# Patient Record
Sex: FEMALE | Race: WHITE | HISPANIC OR LATINO | Employment: FULL TIME | URBAN - METROPOLITAN AREA
[De-identification: names, ages, dates, MRNs, and addresses within clinical notes are randomized per-mention and may not be internally consistent; named-entity substitution may affect disease eponyms.]

---

## 2022-11-17 DIAGNOSIS — Z12.31 OTHER SCREENING MAMMOGRAM: Primary | ICD-10-CM

## 2022-12-05 ENCOUNTER — HOSPITAL ENCOUNTER (OUTPATIENT)
Dept: RADIOLOGY | Facility: HOSPITAL | Age: 42
Discharge: HOME | End: 2022-12-05
Attending: CLINIC/CENTER
Payer: COMMERCIAL

## 2022-12-05 DIAGNOSIS — Z12.31 OTHER SCREENING MAMMOGRAM: ICD-10-CM

## 2022-12-05 PROCEDURE — 77067 MAMMO DIGITAL SCREENING BILAT WITH TOMO: ICD-10-PCS | Mod: 26,,, | Performed by: RADIOLOGY

## 2022-12-05 PROCEDURE — 77063 MAMMO DIGITAL SCREENING BILAT WITH TOMO: ICD-10-PCS | Mod: 26,,, | Performed by: RADIOLOGY

## 2022-12-05 PROCEDURE — 77067 SCR MAMMO BI INCL CAD: CPT | Mod: 26,,, | Performed by: RADIOLOGY

## 2022-12-05 PROCEDURE — 77063 BREAST TOMOSYNTHESIS BI: CPT | Mod: 26,,, | Performed by: RADIOLOGY

## 2022-12-05 PROCEDURE — 77067 SCR MAMMO BI INCL CAD: CPT | Mod: TC

## 2022-12-05 PROCEDURE — 77063 BREAST TOMOSYNTHESIS BI: CPT | Mod: TC

## 2023-09-18 ENCOUNTER — CLINICAL SUPPORT (OUTPATIENT)
Dept: REHABILITATION | Facility: HOSPITAL | Age: 43
End: 2023-09-18
Payer: COMMERCIAL

## 2023-09-18 DIAGNOSIS — R29.898 WEAKNESS OF RIGHT LOWER EXTREMITY: ICD-10-CM

## 2023-09-18 DIAGNOSIS — R26.89 IMPAIRED GAIT AND MOBILITY: ICD-10-CM

## 2023-09-18 DIAGNOSIS — M25.661 DECREASED RANGE OF MOTION (ROM) OF RIGHT KNEE: ICD-10-CM

## 2023-09-18 PROCEDURE — 97161 PT EVAL LOW COMPLEX 20 MIN: CPT

## 2023-09-20 PROBLEM — R29.898 WEAKNESS OF RIGHT LOWER EXTREMITY: Status: ACTIVE | Noted: 2023-09-20

## 2023-09-20 PROBLEM — R26.89 IMPAIRED GAIT AND MOBILITY: Status: ACTIVE | Noted: 2023-09-20

## 2023-09-20 PROBLEM — M25.661 DECREASED RANGE OF MOTION (ROM) OF RIGHT KNEE: Status: ACTIVE | Noted: 2023-09-20

## 2023-09-20 NOTE — PLAN OF CARE
[facility]   Physical Therapy Initial Evaluation      Name: Jennifer Christian  Clinic Number: [id number]    Therapy Diagnosis:   Encounter Diagnoses   Name Primary?    Decreased range of motion (ROM) of right knee     Weakness of right lower extremity     Impaired gait and mobility         Physician: Matthias Roper MD    Physician Orders: PT Eval and Treat   Medical Diagnosis from Referral: M70.50 (ICD-10-CM) - Pes anserinus bursitis  Evaluation Date: 9/18/2023  Authorization Period Expiration: 9/12/2024  Plan of Care Expiration: 12/18/2023  Progress Note Due: 10/18/2023  Visit # / Visits authorized: 1/1 (pending)   FOTO: 1/ 3    Precautions: Standard     Time In: 8:55 AM  Time Out: 9:40 AM  Total Billable Time: 45 minutes    Subjective   Date of onset: 5 weeks ago     History of current condition - 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 She states she was lifting a case of water from the shelf to put into a grocrery basket, turned and felt increased pain in her Right knee. She reports later that day the pain became severe and her R knee swelled. She reports difficulty walking because of the pain. She states she took off work the past 2 days and the pain is a little better, but when she works the pain is worse at the end of the work day. She describes Right knee pain as achy and sharp. Aggravating factors include bending R knee, prolonged standing, walking, and climbing stairs.       Falls: no     Imaging: see imaging section    Prior Therapy: no   Social History: 2 story home   Occupation: nanda at [employer]   Prior Level of Function: no limitations  Current Level of Function: difficulty with prolonged standing, walking, climbing stairs, limited with job duties    Pain:  Current 4/10, worst 7/10, best 3/10   Location: Right knee   Description: Aching and Sharp  Aggravating Factors: bending knee,  standing, walking, climbing stairs   Easing Factors: pain medication, rest     Patients goals: walk normally, work without pain/limitations      Medical History:   No past medical history on file.    Surgical History:   Jennifer Christian  has no past surgical history on file.    Medications:   Jennifer currently has no medications in their medication list.    Allergies:   Review of patient's allergies indicates:  Not on File     Objective      Gait: antalgic, Right trendelenburg, decreased stance time and heel strike R LE    Range of Motion: (Measured in degrees)   Knee Right active Right Passive Left Active Left passive Goal   Flexion 110, pain 115, pain 127  125 deg.   Extension 0  0  0 deg.     Lower Extremity Strength:  Right LE  Left LE  Goal   Knee extension: 4/5, pain medial knee Knee extension: 5/5 5/5   Knee flexion: 4+/5, pain medial knee  Knee flexion: 5/5 5/5   Hip flexion: 5/5 Hip flexion: 5/5 5/5   Hip extension:  4+/5 Hip extension: 5/5 5/5   Hip abduction: Unable to test due to pain  Hip abduction: NT 5/5     Ligamentous Pathology   Test Right   Anterior Drawer Negative   Lachman's Negative   Lateral Collateral Stress Negative   Medial Collateral Stress Negative   Posterior Drawer Negative   Sag Sign Negative   Apley's Distraction Negative     Meniscal Pathology   Test Right   Hemanth's Positive   Thessaly Test @ 5 deg. NT due to pain   Thessaly Test @ 20 deg. NT due to pain    Apley's Compression Positive       Palpation: TTP Right tibiofemoral medial joint line, patella tendon, distal medial and lateral hamstring tendons, pes ascerine insertion     Edema: none noted       Intake Outcome Measure for FOTO Knee Survey    Therapist reviewed FOTO scores for Jennifer Christian on 9/18/2023.   FOTO report - see Media section or FOTO account episode details.    Intake Score: 28% functional ability          Treatment     Total Treatment time (time-based codes) separate from Evaluation: 5 minutes     JENNIFER  received the treatments listed below:      therapeutic exercises to develop strength and ROM for 5 minutes including:    Education HEP  Quad sets  Heel slides  LAQ      Patient Education and Home Exercises     Education provided:   - Anatomy and objective findings  - Physical Therapy Plan of Care  - HEP     Written Home Exercises Provided: yes. Exercises were reviewed and JENNIFER was able to demonstrate them prior to the end of the session.  JENNIFER demonstrated good  understanding of the education provided. See EMR under Patient Instructions for exercises provided during therapy sessions.    Assessment   Jennifer is a 43 y.o. female referred to outpatient Physical Therapy with a medical diagnosis of M70.50 (ICD-10-CM) - Pes anserinus bursitis. Upon physical assessment, patient demonstrates decreased Right knee range of motion, Right LE weakness, impaired gait, tenderness to palpation to Right medial tibiofemoral joint line and patella tendon, and positive meniscus pathology tests. Patient's impairments limit her ability to stand for prolonged periods, walk, squat, climb stairs, and perform job duties as nanda at [employer]. Patient prognosis is Good. Patient will benefit from skilled outpatient Physical Therapy to address the deficits stated above and in the chart below, provide patient education, and to maximize patient's level of independence and quality of life.     Plan of care discussed with patient: Yes  Patient's spiritual, cultural and educational needs considered and patient is agreeable to the plan of care and goals as stated below:     Anticipated Barriers for therapy: none    Medical Necessity is demonstrated by the following  History  Co-morbidities and personal factors that may impact the plan of care [x] LOW: no personal factors / co-morbidities  [] MODERATE: 1-2 personal factors / co-morbidities  [] HIGH: 3+ personal factors / co-morbidities    Moderate / High Support Documentation:   Co-morbidities affecting  plan of care:     Personal Factors:   no deficits     Examination  Body Structures and Functions, activity limitations and participation restrictions that may impact the plan of care [] LOW: addressing 1-2 elements  [x] MODERATE: 3+ elements  [] HIGH: 4+ elements (please support below)    Moderate / High Support Documentation: knee ROM, LE strength, gait     Clinical Presentation [] LOW: stable  [x] MODERATE: Evolving  [] HIGH: Unstable     Decision Making/ Complexity Score: low       Goals:    SHORT TERM GOALS:  4 weeks 9/18/23   Patient will be independent with HEP in order to assist therapy in return to maximal function.    Pain rating at Worst: 4/10 or less for improved tolerance to prolonged walking.     Patient will demonstrate Right knee AROM flexion at least 120 degrees.       LONG TERM GOALS: 8 weeks 9/18/23   Patient will improve AROM to stated goals in order to return to maximal functional potential.     Patient will demonstrate gross Right LE strength 5/5 to climb stairs with minimal to no difficulty.     Pain Rating at Worst: 2/10 or less to improve overall Quality of Life.     Patient will meet predicted functional outcome (FOTO) score: 60% functional ability or greater to increase self-perceived functional ability.    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       Plan   Plan of care Certification: 9/18/2023 to 10/18/2023.    Outpatient Physical Therapy 1-2 times weekly for 6-8 weeks to include the following interventions: Gait Training, Manual Therapy, Moist Heat/ Ice, Neuromuscular Re-ed, Patient Education, Therapeutic Activities, and Therapeutic Exercise.     Milagro Cleary PT      Physician's Signature: _________________________________________ Date: ________________

## 2023-09-21 ENCOUNTER — OFFICE VISIT (OUTPATIENT)
Dept: ORTHOPEDICS | Facility: CLINIC | Age: 43
End: 2023-09-21
Payer: COMMERCIAL

## 2023-09-21 DIAGNOSIS — M25.561 ACUTE PAIN OF RIGHT KNEE: Primary | ICD-10-CM

## 2023-09-21 DIAGNOSIS — M25.569 KNEE PAIN, UNSPECIFIED CHRONICITY, UNSPECIFIED LATERALITY: Primary | ICD-10-CM

## 2023-09-21 PROCEDURE — 1160F PR REVIEW ALL MEDS BY PRESCRIBER/CLIN PHARMACIST DOCUMENTED: ICD-10-PCS | Mod: CPTII,S$GLB,, | Performed by: ORTHOPAEDIC SURGERY

## 2023-09-21 PROCEDURE — 99204 OFFICE O/P NEW MOD 45 MIN: CPT | Mod: S$GLB,,, | Performed by: ORTHOPAEDIC SURGERY

## 2023-09-21 PROCEDURE — 99204 PR OFFICE/OUTPT VISIT, NEW, LEVL IV, 45-59 MIN: ICD-10-PCS | Mod: S$GLB,,, | Performed by: ORTHOPAEDIC SURGERY

## 2023-09-21 PROCEDURE — 1159F PR MEDICATION LIST DOCUMENTED IN MEDICAL RECORD: ICD-10-PCS | Mod: CPTII,S$GLB,, | Performed by: ORTHOPAEDIC SURGERY

## 2023-09-21 PROCEDURE — 99999 PR PBB SHADOW E&M-NEW PATIENT-LVL III: ICD-10-PCS | Mod: PBBFAC,,, | Performed by: ORTHOPAEDIC SURGERY

## 2023-09-21 PROCEDURE — 1159F MED LIST DOCD IN RCRD: CPT | Mod: CPTII,S$GLB,, | Performed by: ORTHOPAEDIC SURGERY

## 2023-09-21 PROCEDURE — 99999 PR PBB SHADOW E&M-NEW PATIENT-LVL III: CPT | Mod: PBBFAC,,, | Performed by: ORTHOPAEDIC SURGERY

## 2023-09-21 PROCEDURE — 1160F RVW MEDS BY RX/DR IN RCRD: CPT | Mod: CPTII,S$GLB,, | Performed by: ORTHOPAEDIC SURGERY

## 2023-09-21 RX ORDER — DICLOFENAC SODIUM 75 MG/1
75 TABLET, DELAYED RELEASE ORAL 2 TIMES DAILY
Qty: 60 TABLET | Refills: 0 | Status: SHIPPED | OUTPATIENT
Start: 2023-09-21

## 2023-09-21 NOTE — PROGRESS NOTES
Assessment: 43 y.o. female with Right knee osteoarthritis     We discussed the findings on xray and clinical exam as well as the natural history of arthritis.     Plan:   - MRI R knee   - Continue PT  - heat   - Increased diclofenac dose to 75, stop taking 50 mg   - Return to clinic once MRI complete     All questions were answered in detail. The patient is in full agreement with the treatment plan and will proceed accordingly.    A note notifying Dr. Matthias Roper of my findings was sent via the electronic medical record     Chief Complaint   Patient presents with    Right Knee - Pain       This patient was seen in consultation at the request of Dr. Matthias Roper     HPI (9/21/23): Jennifer Foss is a 43 y.o. female who presents today complaining of right knee pain  Duration of symptoms:  about 6 weeks  Trauma or new activity: yes - pivoted while carrying something - felt pain in her R knee and back  Felt a little pain the next day in the knee. No pain in back the next day   Has had pain since then  No improvement with diclofenac pills and cream x 2 weeks prescribed by PCP   Did PT this week - concern for meniscus tear   Has had some swelling   + popping   Has not been able to go to work because of her pain   Has MRI ordered     Has another chart: [id number]  PT visits are under this MRN     This is the extent of the patient's complaints at this time.     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    History reviewed. No pertinent past medical history.    There is no problem list on file for this patient.      History reviewed. No pertinent surgical history.      No family history on file.    Physical Exam:   Vitals:    09/21/23 1249   PainSc:   8   PainLoc: Knee       General: Patient is alert, awake and oriented to time, place and person. Mood and affect are appropriate.  Patient does not  appear to be in any distress, denies any constitutional symptoms and appears stated age.   HEENT: Pupils are equal and round, sclera are not injected. External examination of ears and nose reveals no abnormalities. Cranial nerves II-X are grossly intact  Skin: no rashes, abrasions or open wounds on the affected extremity   Resp: No respiratory distress or audible wheezing   CV: 2+  pulses, all extremities warm and well perfused   Right knee(s)  Bilateral KNEE EXAMINATION        OBSERVATION / INSPECTION   Gait:                            antaglic  Alignment:                  neutral  Scars:                         none  Muscle atrophy:          none  Effusion:                     none  Warmth:                      none   Discoloration:              none      TENDERNESS                                                                                                                               Patella                         -                       Lateral joint line                       -   Peripatellar medial      -                       Medial joint line                       ++     Peripatellar lateral       -                       Medial plica                             -             Patellar tendon            -                       Popliteal fossa                        -             Quad tendon               -                       Gastrocnemius                        -  Prepatellar Bursa        -                       Quadricep                               -  Tibial tubercle              -                      Thigh/hamstring                      -  Pes anserine/HS         -                       Fibula                                      -  ITB                               -                      Tibia                                        -  Tib/fib joint                   -                      LCL                                          -                         MFC                            -                        MCL:    Proximal                      -                         LFC                             -                                   Distal                           -                                                                ROM:                          PASSIVE                                                 ACTIVE   Left :                            0 / 150                                  0 / 150      Right :                          10 / 90                              10 / 90      PATELLOFEMORAL EXAMINATION:  See above noted areas of tenderness.   Patella position    Subluxation / dislocation:         Normal   Sup / Inf;                                  Normal   Crepitus (PF):                                      Absent   Patellar Mobility:                                               Medial-lateral:                         Normal  Superior-inferior:                     Normal   Inferior tilt                                Normal  Patellar tendon:                       Normal  Lateral tilt:                               Normal  J-sign:                                    Absent  Patellofemoral grind:               non painful         MENISCAL SIGNS:                              Pain on terminal extension:                yes  Pain on terminal flexion:                     yes  Hemanths maneuver:                       + medial   Squat                                                  not tested      LIGAMENT EXAMINATION:  ACL / Lachman:         normal (0 - 2 mm)   PCL-Post.  drawer:   normal (0 - 2 mm)  MCL- Valgus:             negative  LCL- Varus:  negative       STRENGTH:   (* = with pain)           PAINFUL SIDE  Quadricep                               5/5  Hamstrin/5     EXTREMITY NEURO-VASCULAR EXAMINATION:     Sensation:  Grossly intact to light touch all dermatomal regions.   Motor Function:  Fully intact motor function at hip, knee, foot and ankle     DTRs;  quadriceps and  achilles 2+.    Vascular status:  DP and PT pulses 2+, brisk capillary refill, symmetric.       Imaging:  3 views right knee: mild medial compartment OA with joint space narrowing    I personally reviewed and interpreted the patient's imaging obtained today in clinic     This note was created by combination of typed  and M-Modal dictation. Transcription and phonetic errors may be present.  If there are any questions, please contact me.    No current outpatient medications on file.

## 2023-09-28 ENCOUNTER — HOSPITAL ENCOUNTER (OUTPATIENT)
Dept: RADIOLOGY | Facility: HOSPITAL | Age: 43
Discharge: HOME | End: 2023-09-28
Attending: ORTHOPAEDIC SURGERY
Payer: COMMERCIAL

## 2023-09-28 ENCOUNTER — CLINICAL SUPPORT (OUTPATIENT)
Dept: REHABILITATION | Facility: HOSPITAL | Age: 43
End: 2023-09-28
Payer: COMMERCIAL

## 2023-09-28 DIAGNOSIS — R29.898 WEAKNESS OF RIGHT LOWER EXTREMITY: ICD-10-CM

## 2023-09-28 DIAGNOSIS — R26.89 IMPAIRED GAIT AND MOBILITY: ICD-10-CM

## 2023-09-28 DIAGNOSIS — M25.561 ACUTE PAIN OF RIGHT KNEE: ICD-10-CM

## 2023-09-28 DIAGNOSIS — M25.661 DECREASED RANGE OF MOTION (ROM) OF RIGHT KNEE: Primary | ICD-10-CM

## 2023-09-28 PROCEDURE — 97110 THERAPEUTIC EXERCISES: CPT

## 2023-09-28 PROCEDURE — 73721 MRI JNT OF LWR EXTRE W/O DYE: CPT | Mod: 26,RT,, | Performed by: RADIOLOGY

## 2023-09-28 PROCEDURE — 73721 MRI KNEE WITHOUT CONTRAST RIGHT: ICD-10-PCS | Mod: 26,RT,, | Performed by: RADIOLOGY

## 2023-09-28 PROCEDURE — 73721 MRI JNT OF LWR EXTRE W/O DYE: CPT | Mod: TC,RT

## 2023-09-28 NOTE — PROGRESS NOTES
[facility]   Physical Therapy Treatment Note      Name: Jennifer davalos Flores  Clinic Number: [id number]    Therapy Diagnosis:   Encounter Diagnoses   Name Primary?    Decreased range of motion (ROM) of right knee Yes    Weakness of right lower extremity     Impaired gait and mobility      Physician: Matthias Roper MD    Visit Date: 9/28/2023    Physician Orders: PT Eval and Treat   Medical Diagnosis from Referral: M70.50 (ICD-10-CM) - Pes anserinus bursitis  Evaluation Date: 9/18/2023  Authorization Period Expiration: 9/12/2024  Plan of Care Expiration: 12/18/2023  Progress Note Due: 10/18/2023  Visit # / Visits authorized: 1/1, 1/20  FOTO: 1/ 3     Precautions: Standard      Time In: 12:10 PM  Time Out: 12:50 PM  Total Billable Time: 40 minutes    PTA Visit #: 0/5     Subjective     Patient reports: she has her MRI scheduled today at 5:45 pm. Patient states she has been unable to find a brace for her knee.    She was compliant with home exercise program.  Response to previous treatment: no change  Functional change: no change    Pain: 7/10  Location: right knee      Objective      Objective Measures updated at progress report unless specified.     Treatment     Jennifer received the treatments listed below:      therapeutic exercises to develop strength, endurance, ROM, and flexibility for 35 minutes including:    Quad sets 20x 5 sec hold  Seated heel slides Right 30x  Sidelying clams 3x10 both sides  Standing hip abduction 3x10 right  Seated SLR 3x10  Standing hamstring curl 2x10  Seated LAQ 5x Not tolerated    manual therapy techniques: were applied to the: right knee for 5 minutes, including:    Joint assessment    Patient Education and Home Exercises       Education ovided:   - home exercise program  - knee brace to purchase    Written Home Exercises Provided: yes. Exercises were reviewed and Jennifer was able to demonstrate them prior to the end of the session.  Jennifer  demonstrated good  understanding of the education provided. See Electronic Medical Record under Patient Instructions for exercises provided during therapy sessions    Assessment     Patient continues to present with increased pain during ambulation. Patient unable to perform OK quadriceps isotonics today. Patient progressed with lateral hip strengthening today. Patient educated on knee brace to purchase to provide stability to knee joint and limit symptoms. Patient scheduled for an MRI today. Plan to reassess patient next visit depending on MRI results and progress plan of care accordingly.    Jennifer Is progressing well towards her goals.   Patient prognosis is Good.     Patient will continue to benefit from skilled outpatient physical therapy to address the deficits listed in the problem list box on initial evaluation, provide pt/family education and to maximize pt's level of independence in the home and community environment.     Patient's spiritual, cultural and educational needs considered and pt agreeable to plan of care and goals.     Anticipated barriers to physical therapy: none    Goals:     SHORT TERM GOALS:  4 weeks 9/18/23   Patient will be independent with HEP in order to assist therapy in return to maximal function.     Pain rating at Worst: 4/10 or less for improved tolerance to prolonged walking.      Patient will demonstrate Right knee AROM flexion at least 120 degrees.         LONG TERM GOALS: 8 weeks 9/18/23   Patient will improve AROM to stated goals in order to return to maximal functional potential.      Patient will demonstrate gross Right LE strength 5/5 to climb stairs with minimal to no difficulty.      Pain Rating at Worst: 2/10 or less to improve overall Quality of Life.      Patient will meet predicted functional outcome (FOTO) score: 60% functional ability or greater to increase self-perceived functional ability.     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         Plan   Plan of care Certification: 9/18/2023 to 10/18/2023.     Outpatient Physical Therapy 1-2 times weekly for 6-8 weeks to include the following interventions: Gait Training, Manual Therapy, Moist Heat/ Ice, Neuromuscular Re-ed, Patient Education, Therapeutic Activities, and Therapeutic Exercise.     Isrrael Estrella, PT

## 2023-10-02 ENCOUNTER — CLINICAL SUPPORT (OUTPATIENT)
Dept: REHABILITATION | Facility: HOSPITAL | Age: 43
End: 2023-10-02
Payer: COMMERCIAL

## 2023-10-02 DIAGNOSIS — M25.661 DECREASED RANGE OF MOTION (ROM) OF RIGHT KNEE: Primary | ICD-10-CM

## 2023-10-02 DIAGNOSIS — R29.898 WEAKNESS OF RIGHT LOWER EXTREMITY: ICD-10-CM

## 2023-10-02 DIAGNOSIS — R26.89 IMPAIRED GAIT AND MOBILITY: ICD-10-CM

## 2023-10-02 PROCEDURE — 97110 THERAPEUTIC EXERCISES: CPT

## 2023-10-02 NOTE — PROGRESS NOTES
[facility]   Physical Therapy Treatment Note      Name: Jennifer davalos Flores  Clinic Number: [id number]    Therapy Diagnosis:   Encounter Diagnoses   Name Primary?    Decreased range of motion (ROM) of right knee Yes    Weakness of right lower extremity     Impaired gait and mobility      Physician: Matthias Roper MD    Visit Date: 10/2/2023    Physician Orders: PT Eval and Treat   Medical Diagnosis from Referral: M70.50 (ICD-10-CM) - Pes anserinus bursitis  Evaluation Date: 9/18/2023  Authorization Period Expiration: 9/12/2024  Plan of Care Expiration: 12/18/2023  Progress Note Due: 10/18/2023  Visit # / Visits authorized: 3/15  FOTO: 1/ 3     Precautions: Standard      Time In: 11:50 AM  Time Out: 12:20 PM  Total Billable Time: 30 minutes    PTA Visit #: 0/5     Subjective     Patient reports: she had a MRI on Thursday and has a follow up appointment with MD today to discuss results and next steps. States she needs to leave early to make it to appt at 1:00 PM. She continues to c/o increased Right knee pain that is limiting her walking and mobility with ADLs. She feels that she won't be able to tolerate returning to work and plans to discuss that with her MD today.     She was compliant with home exercise program.  Response to previous treatment: no change  Functional change: no change    Pain: 7/10  Location: right knee      Objective      Objective Measures updated at progress report unless specified.     Treatment     Jennifer received the treatments listed below:      therapeutic exercises to develop strength, endurance, ROM, and flexibility for 30 minutes including:    Quad sets 30x 5 sec hold  Seated heel slides Right 30x  Sidelying clams 3x10 both sides  Standing hip abduction 3x10 right LE  Standing hip extension 3x10 right LE  Seated  SLR 3x10  Standing hamstring curl 2x10    Not performed:  Seated LAQ 5x     manual therapy techniques: were applied to the: right knee  for 00 minutes, including:    Joint assessment    Patient Education and Home Exercises       Education ovided:   - home exercise program  - knee brace to purchase    Written Home Exercises Provided: yes. Exercises were reviewed and Jennifer was able to demonstrate them prior to the end of the session.  Jennifer demonstrated good  understanding of the education provided. See Electronic Medical Record under Patient Instructions for exercises provided during therapy sessions    Assessment   Pt continues to demonstrate antalgic gait secondary to Right knee pain. She reports that the pain has not improved since onset and she continues to be limited with ADLs. She had a MRI last week and has a follow up visit today with MD to discuss results. She is currently limited with therex due to pain, but was able to tolerate exercises without c/o increased symptoms. Will monitor and progress per pt's tolerance.      Jennifer Is progressing well towards her goals.   Patient prognosis is Good.     Patient will continue to benefit from skilled outpatient physical therapy to address the deficits listed in the problem list box on initial evaluation, provide pt/family education and to maximize pt's level of independence in the home and community environment.     Patient's spiritual, cultural and educational needs considered and pt agreeable to plan of care and goals.     Anticipated barriers to physical therapy: none    Goals:     SHORT TERM GOALS:  4 weeks 9/18/23   Patient will be independent with HEP in order to assist therapy in return to maximal function.     Pain rating at Worst: 4/10 or less for improved tolerance to prolonged walking.      Patient will demonstrate Right knee AROM flexion at least 120 degrees.         LONG TERM GOALS: 8 weeks 9/18/23   Patient will improve AROM to stated goals in order to return to maximal functional potential.      Patient will demonstrate gross Right LE strength 5/5 to climb stairs with minimal to no  difficulty.      Pain Rating at Worst: 2/10 or less to improve overall Quality of Life.      Patient will meet predicted functional outcome (FOTO) score: 60% functional ability or greater to increase self-perceived functional ability.     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         Plan   Plan of care Certification: 9/18/2023 to 10/18/2023.     Outpatient Physical Therapy 1-2 times weekly for 6-8 weeks to include the following interventions: Gait Training, Manual Therapy, Moist Heat/ Ice, Neuromuscular Re-ed, Patient Education, Therapeutic Activities, and Therapeutic Exercise.     Milagro Cleary, PT

## 2023-10-05 ENCOUNTER — CLINICAL SUPPORT (OUTPATIENT)
Dept: REHABILITATION | Facility: HOSPITAL | Age: 43
End: 2023-10-05
Payer: COMMERCIAL

## 2023-10-05 DIAGNOSIS — R26.89 IMPAIRED GAIT AND MOBILITY: ICD-10-CM

## 2023-10-05 DIAGNOSIS — M25.661 DECREASED RANGE OF MOTION (ROM) OF RIGHT KNEE: Primary | ICD-10-CM

## 2023-10-05 DIAGNOSIS — R29.898 WEAKNESS OF RIGHT LOWER EXTREMITY: ICD-10-CM

## 2023-10-05 PROCEDURE — 97110 THERAPEUTIC EXERCISES: CPT

## 2023-10-05 PROCEDURE — 97116 GAIT TRAINING THERAPY: CPT

## 2023-10-05 NOTE — PROGRESS NOTES
[facility]   Physical Therapy Treatment Note      Name: Jennifer davalos Flores  Clinic Number: [id number]    Therapy Diagnosis:   Encounter Diagnoses   Name Primary?    Decreased range of motion (ROM) of right knee Yes    Weakness of right lower extremity     Impaired gait and mobility        Physician: Matthias Roper MD    Visit Date: 10/5/2023    Physician Orders: PT Eval and Treat   Medical Diagnosis from Referral: M70.50 (ICD-10-CM) - Pes anserinus bursitis  Evaluation Date: 9/18/2023  Authorization Period Expiration: 9/12/2024  Plan of Care Expiration: 12/18/2023  Progress Note Due: 10/18/2023  Visit # / Visits authorized: 3/15 (+eval)  FOTO: 1/ 3     Precautions: Standard      Time In: 10:45 AM  Time Out: 11:30 PM  Total Billable Time: 30 minutes    PTA Visit #: 0/5     Subjective     Patient reports: she had a MRI on Thursday and has a follow up appointment with MD today to discuss results and next steps. States she needs to leave early to make it to appt at 1:00 PM. She continues to c/o increased Right knee pain that is limiting her walking and mobility with ADLs. She feels that she won't be able to tolerate returning to work and plans to discuss that with her MD today.     She was compliant with home exercise program.  Response to previous treatment: no change  Functional change: no change    Pain: 6/10  Location: right knee      Objective      Objective Measures updated at progress report unless specified.     Treatment     Jenniefr received the treatments listed below:      gait training to improve functional mobility and safety for 10  minutes, including:  - three point gait pattern with straight cane to reduce weight bearing on right lower extremity and improve gait pattern    therapeutic exercises to develop strength, endurance, ROM, and flexibility for 30 minutes including:    Quad sets 20x 10 sec hold  Seated heel slides Right 30x  Sidelying clams 3x10 both  sides  Sidelying hip abduction 3x10 right LE  Prone hip extension 3x10 right LE  Supine  SLR 3x10  Prong hamstring curl 2x10  Seated LAQ 3 x 10     manual therapy techniques: were applied to the: right knee for 5 minutes, including:  Distraction + passive flexion seated at edge of table    Ice to right knee x 5 min at end of session    Patient Education and Home Exercises       Education ovided:   - home exercise program  - knee brace to purchase    Written Home Exercises Provided: yes. Exercises were reviewed and Jennifer was able to demonstrate them prior to the end of the session.  Jennifer demonstrated good  understanding of the education provided. See Electronic Medical Record under Patient Instructions for exercises provided during therapy sessions    Assessment   Pt continues to demonstrate antalgic gait secondary to Right knee pain, initiated gait training with straight cane to reduce right weight bearing and encouraged increased knee flexion to allow for heel strike and push off. Pt has pain with end range flexion, but no extensor lag noted during straight leg raises.  She is currently limited with therex due to pain, but was able to tolerate exercises with only mild increased in symptoms. Will monitor and progress per pt's tolerance.      Jennifer Is progressing well towards her goals.   Patient prognosis is Good.     Patient will continue to benefit from skilled outpatient physical therapy to address the deficits listed in the problem list box on initial evaluation, provide pt/family education and to maximize pt's level of independence in the home and community environment.     Patient's spiritual, cultural and educational needs considered and pt agreeable to plan of care and goals.     Anticipated barriers to physical therapy: none    Goals:     SHORT TERM GOALS:  4 weeks 9/18/23   Patient will be independent with HEP in order to assist therapy in return to maximal function. (PROGRESSING, NOT MET)     Pain rating at  Worst: 4/10 or less for improved tolerance to prolonged walking.  (PROGRESSING, NOT MET)     Patient will demonstrate Right knee AROM flexion at least 120 degrees.   (PROGRESSING, NOT MET)       LONG TERM GOALS: 8 weeks 9/18/23   Patient will improve AROM to stated goals in order to return to maximal functional potential.  (PROGRESSING, NOT MET)     Patient will demonstrate gross Right LE strength 5/5 to climb stairs with minimal to no difficulty.  (PROGRESSING, NOT MET)     Pain Rating at Worst: 2/10 or less to improve overall Quality of Life.   (PROGRESSING, NOT MET)    Patient will meet predicted functional outcome (FOTO) score: 60% functional ability or greater to increase self-perceived functional ability.  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   (PROGRESSING, NOT MET)       Plan   Plan of care Certification: 9/18/2023 to 10/18/2023.     Outpatient Physical Therapy 1-2 times weekly for 6-8 weeks to include the following interventions: Gait Training, Manual Therapy, Moist Heat/ Ice, Neuromuscular Re-ed, Patient Education, Therapeutic Activities, and Therapeutic Exercise.     Betty Baumann, PT

## 2023-10-09 ENCOUNTER — CLINICAL SUPPORT (OUTPATIENT)
Dept: REHABILITATION | Facility: HOSPITAL | Age: 43
End: 2023-10-09
Payer: COMMERCIAL

## 2023-10-09 DIAGNOSIS — R26.89 IMPAIRED GAIT AND MOBILITY: ICD-10-CM

## 2023-10-09 DIAGNOSIS — R29.898 WEAKNESS OF RIGHT LOWER EXTREMITY: ICD-10-CM

## 2023-10-09 DIAGNOSIS — M25.661 DECREASED RANGE OF MOTION (ROM) OF RIGHT KNEE: Primary | ICD-10-CM

## 2023-10-09 PROCEDURE — 97110 THERAPEUTIC EXERCISES: CPT | Mod: CQ

## 2023-10-09 PROCEDURE — 97140 MANUAL THERAPY 1/> REGIONS: CPT | Mod: CQ

## 2023-10-09 NOTE — PROGRESS NOTES
[facility]   Physical Therapy Treatment Note      Name: Jennifer Foss  Clinic Number: [id number]    Therapy Diagnosis:   Encounter Diagnoses   Name Primary?    Decreased range of motion (ROM) of right knee Yes    Weakness of right lower extremity     Impaired gait and mobility      Physician: Matthias Roper MD    Visit Date: 10/9/2023    Physician Orders: PT Eval and Treat   Medical Diagnosis from Referral: M70.50 (ICD-10-CM) - Pes anserinus bursitis  Evaluation Date: 9/18/2023  Authorization Period Expiration: 9/12/2024  Plan of Care Expiration: 12/18/2023  Progress Note Due: 10/18/2023    Visit # / Visits authorized: 4/15 (+eval)    FOTO: 1/ 3     Precautions: Standard     PTA Visit #: 1/5     Time In: 11:00 AM   Time Out: 11:35 AM   Total Billable Time: 35 minutes    Subjective     Patient reports increased pain upon entering Physical Therapy treatment today.       She was compliant with home exercise program.  Response to previous treatment: no change  Functional change: no change     Pain: 8/10  Location: right knee  Objective      Objective Measures updated at progress report unless specified.     Treatment     Jennifer received the treatments listed below:      gait training to improve functional mobility and safety for 00 minutes, including:  - three point gait pattern with straight cane to reduce weight bearing on right lower extremity and improve gait pattern     therapeutic exercises to develop strength, endurance, ROM, and flexibility for 20 minutes including:     Quad sets 20 x 10 sec hold  Supine  SLR 2 x 10  Seated heel slides Right 30 x  Seated LAQ 3 x 10     NOT PERFORMED TODAY   Sidelying clams 3 x 10 both sides  Sidelying hip abduction 3 x 10 right lower extremity   Prone hip extension 3 x 10 right lower extremity   Prong hamstring curl 2 x 10     manual therapy techniques: were applied to the: right knee for 15 minutes, including:    Distraction +  passive flexion seated at edge of table     Ice to right knee x 5 min at end of session    Patient Education and Home Exercises       Education ovided:   - home exercise program  - knee brace to purchase     Written Home Exercises Provided: yes. Exercises were reviewed and Jennifer was able to demonstrate them prior to the end of the session.  Jennifer demonstrated good  understanding of the education provided. See Electronic Medical Record under Patient Instructions for exercises provided during therapy sessions    Assessment     Patient with increased tenderness throughout medial left hamstring throughout light Soft Tissue Massage, however, patient able to tolerate full treatment. Patient able to complete straight leg raises, however, patient still with discomfort and unable to complete last set of 10 due to pain. Patient with increased discomfort throughout manual distraction with knee flexion. Patient opted to end Physical Therapy treatment after seated long arch quads due to patient with increased pain. Patient then received CP treatment to front and back of knee to help decrease pain.     Jennifer Is progressing well towards her goals.   Pt prognosis is Good.     Pt will continue to benefit from skilled outpatient physical therapy to address the deficits listed in the problem list box on initial evaluation, provide pt/family education and to maximize pt's level of independence in the home and community environment.     Pt's spiritual, cultural and educational needs considered and pt agreeable to plan of care and goals.     Anticipated barriers to physical therapy: none    Goals:   SHORT TERM GOALS:  4 weeks 9/18/23   Patient will be independent with HEP in order to assist therapy in return to maximal function. (PROGRESSING, NOT MET)     Pain rating at Worst: 4/10 or less for improved tolerance to prolonged walking.  (PROGRESSING, NOT MET)     Patient will demonstrate Right knee AROM flexion at least 120 degrees.    (PROGRESSING, NOT MET)       LONG TERM GOALS: 8 weeks 9/18/23   Patient will improve AROM to stated goals in order to return to maximal functional potential.  (PROGRESSING, NOT MET)     Patient will demonstrate gross Right LE strength 5/5 to climb stairs with minimal to no difficulty.  (PROGRESSING, NOT MET)     Pain Rating at Worst: 2/10 or less to improve overall Quality of Life.   (PROGRESSING, NOT MET)    Patient will meet predicted functional outcome (FOTO) score: 60% functional ability or greater to increase self-perceived functional ability.  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   (PROGRESSING, NOT MET)      Plan     Continue with Physical Therapist Plan of Care.     Gerard Fischer, PTA

## 2023-10-11 ENCOUNTER — TELEPHONE (OUTPATIENT)
Dept: URGENT CARE | Facility: CLINIC | Age: 43
End: 2023-10-11
Payer: COMMERCIAL

## 2023-10-11 ENCOUNTER — OFFICE VISIT (OUTPATIENT)
Dept: ORTHOPEDICS | Facility: CLINIC | Age: 43
End: 2023-10-11
Payer: COMMERCIAL

## 2023-10-11 DIAGNOSIS — M17.11 PRIMARY OSTEOARTHRITIS OF RIGHT KNEE: Primary | ICD-10-CM

## 2023-10-11 PROCEDURE — 99999 PR PBB SHADOW E&M-EST. PATIENT-LVL III: CPT | Mod: PBBFAC,,, | Performed by: ORTHOPAEDIC SURGERY

## 2023-10-11 PROCEDURE — 1160F RVW MEDS BY RX/DR IN RCRD: CPT | Mod: CPTII,S$GLB,, | Performed by: ORTHOPAEDIC SURGERY

## 2023-10-11 PROCEDURE — 99999 PR PBB SHADOW E&M-EST. PATIENT-LVL III: ICD-10-PCS | Mod: PBBFAC,,, | Performed by: ORTHOPAEDIC SURGERY

## 2023-10-11 PROCEDURE — 1159F PR MEDICATION LIST DOCUMENTED IN MEDICAL RECORD: ICD-10-PCS | Mod: CPTII,S$GLB,, | Performed by: ORTHOPAEDIC SURGERY

## 2023-10-11 PROCEDURE — 1160F PR REVIEW ALL MEDS BY PRESCRIBER/CLIN PHARMACIST DOCUMENTED: ICD-10-PCS | Mod: CPTII,S$GLB,, | Performed by: ORTHOPAEDIC SURGERY

## 2023-10-11 PROCEDURE — 99213 PR OFFICE/OUTPT VISIT, EST, LEVL III, 20-29 MIN: ICD-10-PCS | Mod: 25,S$GLB,, | Performed by: ORTHOPAEDIC SURGERY

## 2023-10-11 PROCEDURE — 99213 OFFICE O/P EST LOW 20 MIN: CPT | Mod: 25,S$GLB,, | Performed by: ORTHOPAEDIC SURGERY

## 2023-10-11 PROCEDURE — 1159F MED LIST DOCD IN RCRD: CPT | Mod: CPTII,S$GLB,, | Performed by: ORTHOPAEDIC SURGERY

## 2023-10-11 NOTE — LETTER
October 11, 2023    Jennifer Foss  [address] [place] [address]         [facility]  [address]  [place] [address]  Phone: [phone] October 11, 2023     Patient: Jennifer Foss   YOB: 1980   Date of Visit: 10/11/2023       To Whom It May Concern:    It is my medical opinion that Jennifer Foss should remain out of work until 10/18/23 .    If you have any questions or concerns, please don't hesitate to call.    Sincerely,        Nancy Griffin MD

## 2023-10-11 NOTE — PROGRESS NOTES
Assessment: 43 y.o. female with Right knee osteoarthritis, degenerative meniscus tears     We discussed the findings on xray and clinical exam as well as the natural history of arthritis.     Plan:   - Meniscus tears seen on MRI associated with significant degenerative changes. Unlikely to benefit from surgical treatment. Recommended injection, continued PT and NSAIDs  - Injection of the right knee  performed, please see procedure note for more details.  Prior to the injection risks and benefits of corticosteroid injection were discussed with the patient including pain, infection, bleeding, skin color changes, swelling, steroid flare. We discussed that over time injections can result in chondral damage, acceleration of arthritis formation, damage to tendons and damage to joints.  The patient consented for the procedure.  Post-injection instructions were given to the patient in writing.  - Note given to stay out of work an additional week   - Will refer to occupational health for management of non operative knee pain with workers comp     All questions were answered in detail. The patient is in full agreement with the treatment plan and will proceed accordingly.      Chief Complaint   Patient presents with    Right Knee - Pain      HPI (9/21/23): Jennifer Foss is a 43 y.o. female who presents today complaining of right knee pain  Duration of symptoms:  about 6 weeks  Trauma or new activity: yes - pivoted while carrying something - felt pain in her R knee and back  Felt a little pain the next day in the knee. No pain in back the next day   Has had pain since then  No improvement with diclofenac pills and cream x 2 weeks prescribed by PCP   Did PT this week - concern for meniscus tear   Has had some swelling   + popping   Has not been able to go to work because of her pain   Has MRI ordered     Has another chart: [id number]  PT visits are under this MRN     10/11/23  Here for MRI results  Still having  severe knee pain   Does not feel she can return to work   Today she states the injury occurred at work and has WC paperwork with her   Has not been seen by [facility]     This is the extent of the patient's complaints at this time.     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    Patient Active Problem List   Diagnosis    Decreased range of motion (ROM) of right knee    Weakness of right lower extremity    Impaired gait and mobility       No past surgical history on file.      No family history on file.    Physical Exam:   Vitals:    10/11/23 1145   PainSc:   8   PainLoc: Knee     General: Patient is alert, awake and oriented to time, place and person. Mood and affect are appropriate.  Patient does not appear to be in any distress, denies any constitutional symptoms and appears stated age.   HEENT: Pupils are equal and round, sclera are not injected. External examination of ears and nose reveals no abnormalities. Cranial nerves II-X are grossly intact  Skin: no rashes, abrasions or open wounds on the affected extremity   Resp: No respiratory distress or audible wheezing   CV: 2+  pulses, all extremities warm and well perfused   Right knee(s)  Bilateral KNEE EXAMINATION        OBSERVATION / INSPECTION   Gait:                            antaglic  Alignment:                  neutral  Scars:                         none  Muscle atrophy:          none  Effusion:                     none  Warmth:                      none   Discoloration:              none      TENDERNESS                                                                                                                               Patella                         -                       Lateral joint line                        -   Peripatellar medial      -                       Medial joint line                       ++     Peripatellar lateral       -                       Medial plica                             -             Patellar tendon            -                       Popliteal fossa                        -             Quad tendon               -                       Gastrocnemius                        -  Prepatellar Bursa        -                       Quadricep                               -  Tibial tubercle              -                      Thigh/hamstring                      -  Pes anserine/HS         -                       Fibula                                      -  ITB                               -                      Tibia                                        -  Tib/fib joint                   -                      LCL                                          -                         MFC                            -                       MCL:    Proximal                      -                         LFC                             -                                   Distal                           -                                                                ROM:                          PASSIVE                                                 ACTIVE   Left :                            0 / 150                                  0 / 150      Right :                          10 / 90                              10 / 90      PATELLOFEMORAL EXAMINATION:  See above noted areas of tenderness.   Patella position    Subluxation / dislocation:         Normal   Sup / Inf;                                  Normal   Crepitus (PF):                                      Absent   Patellar Mobility:                                               Medial-lateral:                         Normal  Superior-inferior:                     Normal   Inferior tilt                                Normal  Patellar  tendon:                       Normal  Lateral tilt:                               Normal  J-sign:                                    Absent  Patellofemoral grind:               non painful         MENISCAL SIGNS:                              Pain on terminal extension:                yes  Pain on terminal flexion:                     yes  Hemanths maneuver:                       + medial   Squat                                                  not tested      LIGAMENT EXAMINATION:  ACL / Lachman:         normal (0 - 2 mm)   PCL-Post.  drawer:   normal (0 - 2 mm)  MCL- Valgus:             negative  LCL- Varus:  negative       STRENGTH:   (* = with pain)           PAINFUL SIDE  Quadricep                               5/5  Hamstrin/5     EXTREMITY NEURO-VASCULAR EXAMINATION:     Sensation:  Grossly intact to light touch all dermatomal regions.   Motor Function:  Fully intact motor function at hip, knee, foot and ankle    DTRs;  quadriceps and  achilles 2+.    Vascular status:  DP and PT pulses 2+, brisk capillary refill, symmetric.       Imaging:  3 views right knee: mild medial compartment OA with joint space narrowing  MRI of the right knee shows full thickness loss of medial cartilage, associated degenerative meniscus tears. No subchondral edema    I personally reviewed and interpreted the patient's imaging obtained today in clinic     This note was created by combination of typed  and M-Modal dictation. Transcription and phonetic errors may be present.  If there are any questions, please contact me.      Current Outpatient Medications:     diclofenac (VOLTAREN) 75 MG EC tablet, Take 1 tablet (75 mg total) by mouth 2 (two) times daily., Disp: 60 tablet, Rfl: 0

## 2023-10-12 ENCOUNTER — DOCUMENTATION ONLY (OUTPATIENT)
Dept: REHABILITATION | Facility: HOSPITAL | Age: 43
End: 2023-10-12
Payer: COMMERCIAL

## 2023-10-13 NOTE — PROGRESS NOTES
[facility]   Physical Therapy Treatment Note      Name: Jennifer Foss  Clinic Number: [id number]    Therapy Diagnosis:   Encounter Diagnoses   Name Primary?    Decreased range of motion (ROM) of right knee Yes    Weakness of right lower extremity     Impaired gait and mobility      Physician: Matthias Roper MD    Visit Date: 10/16/2023    Physician Orders: PT Eval and Treat   Medical Diagnosis from Referral: M70.50 (ICD-10-CM) - Pes anserinus bursitis  Evaluation Date: 9/18/2023  Authorization Period Expiration: 9/12/2024  Plan of Care Expiration: 12/18/2023  Progress Note Due: 10/18/2023    Visit # / Visits authorized: 6/15 (+eval)    FOTO: 1/ 3     Precautions: Standard     PTA Visit #: 2/5     Time In: 8:45 AM   Time Out: 9:25 AM   Total Billable Time: 30 minutes    Subjective     Patient reports continued discomfort throughout medial knee joint upon entering Physical Therapy treatment.      She was compliant with home exercise program.  Response to previous treatment: no change  Functional change: no change     Pain: 5 /10 with walking a lot   Location: right knee  Objective      Objective Measures updated at progress report unless specified.     Treatment     Jennifer received the treatments listed below:      gait training to improve functional mobility and safety for 00 minutes, including:  - three point gait pattern with straight cane to reduce weight bearing on right lower extremity and improve gait pattern     therapeutic exercises to develop strength, endurance, ROM, and flexibility for 25 minutes including:     Quad sets 20 x 10 sec hold  Supine  SLR 2 x 10  Seated heel slides Right 30 x  Seated LAQ 3 x 10   Sidelying clams 3 x 10 both sides  Sidelying hip abduction 3 x 10 right lower extremity   Prone hip extension 3 x 10 right lower extremity   Prong hamstring curl 2 x 10     manual therapy techniques: were applied to the: right knee for 5 minutes,  including:    Distraction + passive flexion seated at edge of table  Trigger point release  Ice to right knee x 5 min at end of session    Patient Education and Home Exercises       Education ovided:   - home exercise program  - knee brace to purchase     Written Home Exercises Provided: yes. Exercises were reviewed and Jennifer was able to demonstrate them prior to the end of the session.  Jennifer demonstrated good  understanding of the education provided. See Electronic Medical Record under Patient Instructions for exercises provided during therapy sessions    Assessment     Patient still with tenderness and discomfort throughout medial knee throughout light Soft Tissue Massage. Patient also with tenderness and tightness throughout medial hamstring. Patient able to tolerate full therapeutic exercise with minimal reports of discomfort throughout therapeutic exercise, however, with rest breaks throughout tasks patient able to complete full reps. Patient with most discomfort with full knee extension throughout strengthening therapeutic exercise. Will continue to monitor and progress patient as tolerated.     Jennifer Is progressing well towards her goals.   Pt prognosis is Good.     Pt will continue to benefit from skilled outpatient physical therapy to address the deficits listed in the problem list box on initial evaluation, provide pt/family education and to maximize pt's level of independence in the home and community environment.     Pt's spiritual, cultural and educational needs considered and pt agreeable to plan of care and goals.     Anticipated barriers to physical therapy: none    Goals:   SHORT TERM GOALS:  4 weeks 9/18/23   Patient will be independent with HEP in order to assist therapy in return to maximal function. (PROGRESSING, NOT MET)     Pain rating at Worst: 4/10 or less for improved tolerance to prolonged walking.  (PROGRESSING, NOT MET)     Patient will demonstrate Right knee AROM flexion at least 120  degrees.   (PROGRESSING, NOT MET)       LONG TERM GOALS: 8 weeks 9/18/23   Patient will improve AROM to stated goals in order to return to maximal functional potential.  (PROGRESSING, NOT MET)     Patient will demonstrate gross Right LE strength 5/5 to climb stairs with minimal to no difficulty.  (PROGRESSING, NOT MET)     Pain Rating at Worst: 2/10 or less to improve overall Quality of Life.   (PROGRESSING, NOT MET)    Patient will meet predicted functional outcome (FOTO) score: 60% functional ability or greater to increase self-perceived functional ability.  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   (PROGRESSING, NOT MET)      Plan     Continue with Physical Therapist Plan of Care.     Gerard Fischer, PTA

## 2023-10-16 ENCOUNTER — DOCUMENTATION ONLY (OUTPATIENT)
Dept: REHABILITATION | Facility: HOSPITAL | Age: 43
End: 2023-10-16
Payer: COMMERCIAL

## 2023-10-16 ENCOUNTER — CLINICAL SUPPORT (OUTPATIENT)
Dept: REHABILITATION | Facility: HOSPITAL | Age: 43
End: 2023-10-16
Payer: COMMERCIAL

## 2023-10-16 DIAGNOSIS — R29.898 WEAKNESS OF RIGHT LOWER EXTREMITY: ICD-10-CM

## 2023-10-16 DIAGNOSIS — R26.89 IMPAIRED GAIT AND MOBILITY: ICD-10-CM

## 2023-10-16 DIAGNOSIS — M25.661 DECREASED RANGE OF MOTION (ROM) OF RIGHT KNEE: Primary | ICD-10-CM

## 2023-10-16 PROCEDURE — 97110 THERAPEUTIC EXERCISES: CPT | Mod: CQ

## 2023-10-16 NOTE — PROGRESS NOTES
PT/PTA met face to face to discuss pt's treatment plan and progress towards established goals. Pt will be seen by a physical therapist minimally every 6th visit or every 30 days.    Gerard Fischer PTA

## 2023-10-19 NOTE — PROGRESS NOTES
[facility]   Physical Therapy Treatment Note      Name: Jennifer Foss  Clinic Number: [id number]    Therapy Diagnosis:   Encounter Diagnoses   Name Primary?    Decreased range of motion (ROM) of right knee Yes    Weakness of right lower extremity     Impaired gait and mobility      Physician: Matthias Roper MD    Visit Date: 10/20/2023    Physician Orders: PT Eval and Treat   Medical Diagnosis from Referral: M70.50 (ICD-10-CM) - Pes anserinus bursitis  Evaluation Date: 9/18/2023  Authorization Period Expiration: 9/12/2024  Plan of Care Expiration: 12/18/2023  Progress Note Due: 11/18/2023    Visit # / Visits authorized: 6/15 (+eval)    FOTO: 1/ 3     Precautions: Standard     PTA Visit #: 0/5     Time In: 9:30 AM   Time Out: 10:10 AM  Total Billable Time: 40 minutes    Subjective     Patient reports her knee is feeling a little better since injection last week, but she continues to have increased pain with prolonged walking. She states at times she feels locking in her knee when walking.      She was compliant with home exercise program.  Response to previous treatment: no change  Functional change: no change     Pain: 3/10  Location: right knee    Objective      10/20/2023  Range of Motion: (Measured in degrees)   Knee Right active Right Passive Left Active Left passive Goal   Flexion 117 122 127   125 deg.   Extension 0   0   0 deg.      Lower Extremity Strength:  Right LE   Left LE   Goal   Knee extension: 4+/5, pain medial knee Knee extension: 5/5 5/5   Knee flexion: 5/5 Knee flexion: 5/5 5/5   Hip flexion: 5/5 Hip flexion: 5/5 5/5   Hip extension:  4+/5 Hip extension: 5/5 5/5   Hip abduction: 4/5 Hip abduction: 5/5 5/5        Treatment     Jennifer received the treatments listed below:      gait training to improve functional mobility and safety for 00 minutes, including:  - three point gait pattern with straight cane to reduce weight bearing on right lower extremity  and improve gait pattern     therapeutic exercises to develop strength, endurance, ROM, and flexibility for 40 minutes including:     Objective measures taken, ROM, MMT    Quad sets 20 x 10 sec hold  Supine  SLR 2 x 10  Seated heel slides Right x30  Seated LAQ 3 x 10   Sidelying clams 3 x 10 both sides  Sidelying hip abduction 3 x 10 right lower extremity   Prone hip extension 3 x 10 right lower extremity   Prong hamstring curl 2 x 10  +Standing TKE 5 sec hold 2x10      Next visit:  Mini squats     manual therapy techniques: were applied to the: right knee for 00 minutes, including:    Distraction + passive flexion seated at edge of table  Trigger point release      Ice to right knee x 5 min at end of session    Patient Education and Home Exercises       Education ovided:   - home exercise program  - knee brace to purchase     Written Home Exercises Provided: yes. Exercises were reviewed and Jennifer was able to demonstrate them prior to the end of the session.  Jennifer demonstrated good  understanding of the education provided. See Electronic Medical Record under Patient Instructions for exercises provided during therapy sessions    Assessment   Pt demonstrates improvements in R knee ROM and R LE strength with deficits remaining as noted above. She demo most limitation in Right lateral hip strength. She continues to be limited by R knee pain and instability; she continues to have difficulty with prolonged standing and walking. She still demo tenderness to R medial knee with palpation. She will continue to benefit from skilled PT to address pain, functional limitations, and to improve her quality of life. Will continue progressing treatment per pt's tolerance.       Jennifer Is progressing towards her goals.   Pt prognosis is Good.     Pt will continue to benefit from skilled outpatient physical therapy to address the deficits listed in the problem list box on initial evaluation, provide pt/family education and to maximize  pt's level of independence in the home and community environment.     Pt's spiritual, cultural and educational needs considered and pt agreeable to plan of care and goals.     Anticipated barriers to physical therapy: none    Goals:   SHORT TERM GOALS:  4 weeks 9/18/23   Patient will be independent with HEP in order to assist therapy in return to maximal function. (PROGRESSING, NOT MET)     Pain rating at Worst: 4/10 or less for improved tolerance to prolonged walking.  (PROGRESSING, NOT MET)     Patient will demonstrate Right knee AROM flexion at least 120 degrees.   (PROGRESSING, NOT MET)       LONG TERM GOALS: 8 weeks 9/18/23   Patient will improve AROM to stated goals in order to return to maximal functional potential.  (PROGRESSING, NOT MET)     Patient will demonstrate gross Right LE strength 5/5 to climb stairs with minimal to no difficulty.  (PROGRESSING, NOT MET)     Pain Rating at Worst: 2/10 or less to improve overall Quality of Life.   (PROGRESSING, NOT MET)    Patient will meet predicted functional outcome (FOTO) score: 60% functional ability or greater to increase self-perceived functional ability.  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   (PROGRESSING, NOT MET)      Plan     Continue with Physical Therapist Plan of Care.     Milagro Cleary, PT

## 2023-10-20 ENCOUNTER — CLINICAL SUPPORT (OUTPATIENT)
Dept: REHABILITATION | Facility: HOSPITAL | Age: 43
End: 2023-10-20
Payer: COMMERCIAL

## 2023-10-20 DIAGNOSIS — M25.661 DECREASED RANGE OF MOTION (ROM) OF RIGHT KNEE: Primary | ICD-10-CM

## 2023-10-20 DIAGNOSIS — R29.898 WEAKNESS OF RIGHT LOWER EXTREMITY: ICD-10-CM

## 2023-10-20 DIAGNOSIS — R26.89 IMPAIRED GAIT AND MOBILITY: ICD-10-CM

## 2023-10-20 PROCEDURE — 97110 THERAPEUTIC EXERCISES: CPT

## 2023-10-30 ENCOUNTER — DOCUMENTATION ONLY (OUTPATIENT)
Dept: REHABILITATION | Facility: HOSPITAL | Age: 43
End: 2023-10-30
Payer: COMMERCIAL

## 2023-10-30 NOTE — PROGRESS NOTES
Patient no-showed Physical Therapy appointment today 10/30/2023 at 11:45 AM. This is her second no-show. Called patient and left a voicemail to inform her on next PT appointment scheduled on 11/3/2023 at 11:45 AM

## 2023-11-03 ENCOUNTER — CLINICAL SUPPORT (OUTPATIENT)
Dept: REHABILITATION | Facility: HOSPITAL | Age: 43
End: 2023-11-03
Payer: COMMERCIAL

## 2023-11-03 DIAGNOSIS — M25.661 DECREASED RANGE OF MOTION (ROM) OF RIGHT KNEE: Primary | ICD-10-CM

## 2023-11-03 DIAGNOSIS — R29.898 WEAKNESS OF RIGHT LOWER EXTREMITY: ICD-10-CM

## 2023-11-03 DIAGNOSIS — R26.89 IMPAIRED GAIT AND MOBILITY: ICD-10-CM

## 2023-11-03 PROCEDURE — 97110 THERAPEUTIC EXERCISES: CPT

## 2023-11-03 NOTE — PROGRESS NOTES
[facility]   Physical Therapy Treatment Note      Name: Jennifer Foss  Clinic Number: [id number]    Therapy Diagnosis:   Encounter Diagnoses   Name Primary?    Decreased range of motion (ROM) of right knee Yes    Weakness of right lower extremity     Impaired gait and mobility      Physician: Matthias Roper MD    Visit Date: 11/3/2023    Physician Orders: PT Eval and Treat   Medical Diagnosis from Referral: M70.50 (ICD-10-CM) - Pes anserinus bursitis  Evaluation Date: 9/18/2023  Authorization Period Expiration: 9/12/2024  Plan of Care Expiration: 12/18/2023  Progress Note Due: 11/18/2023    Visit # / Visits authorized: 7/15 (+eval)    FOTO: 1/ 3     Precautions: Standard     PTA Visit #: 0/5     Time In: 11:45 AM   Time Out: 12:30 PM  Total Billable Time: 45 minutes    Subjective     Patient reports she missed PT sessions for the last 2 weeks due to being sick. She reports her knee is feeling a little better, but she continues to have difficulty walking long distances.      She was compliant with home exercise program.  Response to previous treatment: no change  Functional change: no change     Pain: 5/10  Location: right knee    Objective      10/20/2023  Range of Motion: (Measured in degrees)   Knee Right active Right Passive Left Active Left passive Goal   Flexion 117 122 127   125 deg.   Extension 0   0   0 deg.      Lower Extremity Strength:  Right LE   Left LE   Goal   Knee extension: 4+/5, pain medial knee Knee extension: 5/5 5/5   Knee flexion: 5/5 Knee flexion: 5/5 5/5   Hip flexion: 5/5 Hip flexion: 5/5 5/5   Hip extension:  4+/5 Hip extension: 5/5 5/5   Hip abduction: 4/5 Hip abduction: 5/5 5/5        Treatment     Jennifer received the treatments listed below:      gait training to improve functional mobility and safety for 00 minutes, including:  - three point gait pattern with straight cane to reduce weight bearing on right lower extremity and improve gait  pattern     therapeutic exercises to develop strength, endurance, ROM, and flexibility for 45 minutes including:    Quad sets c/ heel prop 20 x 10 sec hold  Supine  SLR 2 x 10  Seated heel slides Right x30 - NP today   Seated LAQ 3 x 10   Sidelying clams c/ 3 sec hold red TB 3 x 10 both sides  Sidelying hip abduction 3 x 10 right lower extremity   Prone hip extension 3 x 10 right lower extremity   Prong hamstring curl 2 x 10  +Standing TKE 5 sec hold 2x10   +Mini squats c/ UE support 2x8     manual therapy techniques: were applied to the: right knee for 00 minutes, including:    Distraction + passive flexion seated at edge of table  Trigger point release      Ice to right knee x 5 min at end of session    Patient Education and Home Exercises       Education ovided:   - home exercise program  - knee brace to purchase     Written Home Exercises Provided: yes. Exercises were reviewed and Jennifer was able to demonstrate them prior to the end of the session.  Jennifer demonstrated good  understanding of the education provided. See Electronic Medical Record under Patient Instructions for exercises provided during therapy sessions    Assessment   Pt returns after 2 week lapse in visits due to being sick. She reports slight improvement in R knee pain, but continues to be limited with walking for prolonged periods. She did well with session today and was able to tolerate few progressions in therex without c/o increased symptoms. Added mini squats with UE support with verbal cueing to facilitate proper hip hinge. Will continue to progress treatment as tolerated with focus on R quad and hip strengthening.       Jennifer Is progressing towards her goals.   Pt prognosis is Good.     Pt will continue to benefit from skilled outpatient physical therapy to address the deficits listed in the problem list box on initial evaluation, provide pt/family education and to maximize pt's level of independence in the home and community environment.      Pt's spiritual, cultural and educational needs considered and pt agreeable to plan of care and goals.     Anticipated barriers to physical therapy: none    Goals:   SHORT TERM GOALS:  4 weeks 9/18/23   Patient will be independent with HEP in order to assist therapy in return to maximal function. (PROGRESSING, NOT MET)     Pain rating at Worst: 4/10 or less for improved tolerance to prolonged walking.  (PROGRESSING, NOT MET)     Patient will demonstrate Right knee AROM flexion at least 120 degrees.   (PROGRESSING, NOT MET)       LONG TERM GOALS: 8 weeks 9/18/23   Patient will improve AROM to stated goals in order to return to maximal functional potential.  (PROGRESSING, NOT MET)     Patient will demonstrate gross Right LE strength 5/5 to climb stairs with minimal to no difficulty.  (PROGRESSING, NOT MET)     Pain Rating at Worst: 2/10 or less to improve overall Quality of Life.   (PROGRESSING, NOT MET)    Patient will meet predicted functional outcome (FOTO) score: 60% functional ability or greater to increase self-perceived functional ability.  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   (PROGRESSING, NOT MET)      Plan     Continue with Physical Therapist Plan of Care.     Milagro Cleary, PT

## 2023-11-06 ENCOUNTER — CLINICAL SUPPORT (OUTPATIENT)
Dept: REHABILITATION | Facility: HOSPITAL | Age: 43
End: 2023-11-06
Payer: COMMERCIAL

## 2023-11-06 DIAGNOSIS — R29.898 WEAKNESS OF RIGHT LOWER EXTREMITY: ICD-10-CM

## 2023-11-06 DIAGNOSIS — R26.89 IMPAIRED GAIT AND MOBILITY: ICD-10-CM

## 2023-11-06 DIAGNOSIS — M25.661 DECREASED RANGE OF MOTION (ROM) OF RIGHT KNEE: Primary | ICD-10-CM

## 2023-11-06 PROCEDURE — 97110 THERAPEUTIC EXERCISES: CPT | Mod: CQ

## 2023-11-06 NOTE — PROGRESS NOTES
[facility]   Physical Therapy Treatment Note      Name: Jennifer davalos Flores  Clinic Number: [id number]    Therapy Diagnosis:   Encounter Diagnoses   Name Primary?    Decreased range of motion (ROM) of right knee Yes    Weakness of right lower extremity     Impaired gait and mobility      Physician: Matthias Roper MD    Visit Date: 11/6/2023    Physician Orders: PT Eval and Treat   Medical Diagnosis from Referral: M70.50 (ICD-10-CM) - Pes anserinus bursitis  Evaluation Date: 9/18/2023  Authorization Period Expiration: 9/12/2024  Plan of Care Expiration: 12/18/2023  Progress Note Due: 11/18/2023     Visit # / Visits authorized: 8/15 (+eval)     FOTO: 1/ 3     Precautions: Standard      PTA Visit #: 1/5     Time In: 11:00 AM   Time Out: 11:45 AM   Total Billable Time: 45 minutes    Subjective     Patient reports relaxing yesterday due to patient doing  lot on Saturday and patient with increased pain throughout lower extremity.     She was compliant with home exercise program.  Response to previous treatment: no change  Functional change: no change     Pain: 4/10  Location: right knee    Objective      Objective Measures updated at progress report unless specified.     Treatment     Jennifer received the treatments listed below:      gait training to improve functional mobility and safety for 00 minutes, including:  - three point gait pattern with straight cane to reduce weight bearing on right lower extremity and improve gait pattern     therapeutic exercises to develop strength, endurance, ROM, and flexibility for 45 minutes including:     Quad sets c/ heel prop 20 x 10 sec hold  Supine  SLR 2 x 10  Seated heel slides Right x30 - NP today   Seated LAQ 3 x 10   Sidelying clams c/ 3 sec hold red thera band 3 x 10 both sides  Sidelying hip abduction 3 x 10 right lower extremity   Prone hip extension 3 x 10 right lower extremity   Prong hamstring curl 2 x 10  Standing TKE 5 sec hold  2x10   Mini squats c/ UE support 2x8      manual therapy techniques: were applied to the: right knee for 00 minutes, including:     Distraction + passive flexion seated at edge of table  Trigger point release     Ice to right knee x 5 min at end of session    Patient Education and Home Exercises       Education ovided:   - home exercise program  - knee brace to purchase     Written Home Exercises Provided: yes. Exercises were reviewed and Jennifer was able to demonstrate them prior to the end of the session.  Jennifer demonstrated good  understanding of the education provided. See Electronic Medical Record under Patient Instructions for exercises provided during therapy sessions    Assessment     Patient able to complete therapeutic exercise with minimal reports of discomfort throughout treatment. Patient with most discomfort with knee flexion therapeutic exercise, however, patient able to complete task with rest breaks and slow performance of tasks. Patient also with some discomfort throughout right knee with mini squats, however, with limited range patient able to complete full reps. Patient will continue to monitor and progress patient as tolerated.     Jennifer Is progressing well towards her goals.   Pt prognosis is Good.     Pt will continue to benefit from skilled outpatient physical therapy to address the deficits listed in the problem list box on initial evaluation, provide pt/family education and to maximize pt's level of independence in the home and community environment.     Pt's spiritual, cultural and educational needs considered and pt agreeable to plan of care and goals.     Anticipated barriers to physical therapy: none     Goals:   SHORT TERM GOALS:  4 weeks 9/18/23   Patient will be independent with HEP in order to assist therapy in return to maximal function. (PROGRESSING, NOT MET)     Pain rating at Worst: 4/10 or less for improved tolerance to prolonged walking.  (PROGRESSING, NOT MET)     Patient will  demonstrate Right knee AROM flexion at least 120 degrees.   (PROGRESSING, NOT MET)       LONG TERM GOALS: 8 weeks 9/18/23   Patient will improve AROM to stated goals in order to return to maximal functional potential.  (PROGRESSING, NOT MET)     Patient will demonstrate gross Right LE strength 5/5 to climb stairs with minimal to no difficulty.  (PROGRESSING, NOT MET)     Pain Rating at Worst: 2/10 or less to improve overall Quality of Life.   (PROGRESSING, NOT MET)    Patient will meet predicted functional outcome (FOTO) score: 60% functional ability or greater to increase self-perceived functional ability.  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   (PROGRESSING, NOT MET)        Plan     Continue with Physical Therapist Plan of Care.     Gerard Fischer, PTA

## 2023-11-10 ENCOUNTER — CLINICAL SUPPORT (OUTPATIENT)
Dept: REHABILITATION | Facility: HOSPITAL | Age: 43
End: 2023-11-10
Payer: COMMERCIAL

## 2023-11-10 ENCOUNTER — PATIENT MESSAGE (OUTPATIENT)
Dept: ORTHOPEDICS | Facility: CLINIC | Age: 43
End: 2023-11-10
Payer: COMMERCIAL

## 2023-11-10 DIAGNOSIS — R26.89 IMPAIRED GAIT AND MOBILITY: ICD-10-CM

## 2023-11-10 DIAGNOSIS — R29.898 WEAKNESS OF RIGHT LOWER EXTREMITY: ICD-10-CM

## 2023-11-10 DIAGNOSIS — M25.661 DECREASED RANGE OF MOTION (ROM) OF RIGHT KNEE: Primary | ICD-10-CM

## 2023-11-10 PROCEDURE — 97110 THERAPEUTIC EXERCISES: CPT

## 2023-11-10 NOTE — PROGRESS NOTES
[facility] OUTPATIENT THERAPY AND WELLNESS   Physical Therapy Treatment Note / Progress Note     Name: Jennifer Foss  Clinic Number: [id number]    Therapy Diagnosis:   Encounter Diagnoses   Name Primary?    Decreased range of motion (ROM) of right knee Yes    Weakness of right lower extremity     Impaired gait and mobility      Physician: Matthias Roper MD    Visit Date: 11/10/2023    Physician Orders: PT Eval and Treat   Medical Diagnosis from Referral: M70.50 (ICD-10-CM) - Pes anserinus bursitis  Evaluation Date: 9/18/2023  Authorization Period Expiration: 9/12/2024  Plan of Care Expiration: 12/18/2023  Progress Note Due: 11/18/2023     Visit # / Visits authorized: 9/15 (+eval)     FOTO: 2/3     Precautions: Standard      PTA Visit #: 1/5     Time In: 11:50 AM   Time Out: 12:30 PM  Total Billable Time: 40 minutes    Subjective     Patient reports continued pain in medial Right knee. She reports pain has improved overall, but continues to increase with prolonged activity such as cleaning her house for an hour, prolonged walking. She continues to wear knee brace when ambulating in the community. She still has not returned to work due to Right knee pain. She has follow-up appointment with MD next week.     She was compliant with home exercise program.  Response to previous treatment: minimal soreness  Functional change: slight improvement in walking tolerance      Pain: 4/10  Location: Right knee    Objective      11/10/2023  Range of Motion: (Measured in degrees)   Knee Right active Right Passive Left Active Left passive Goal   Flexion 120, mild pain 122 127   125 deg.   Extension 0   0   0 deg.      Lower Extremity Strength:  Right LE   Left LE   Goal   Knee extension: 4+/5, mild pain medial knee Knee extension: 5/5 5/5   Knee flexion: 5/5 Knee flexion: 5/5 5/5   Hip flexion: 5/5 Hip flexion: 5/5 5/5   Hip extension:  5/5 Hip extension: 5/5 5/5   Hip abduction: 4+/5 Hip abduction: 5/5 5/5      Palpation: moderate TTP Right medial tibiofemoral joint line    Treatment     Jennifer received the treatments listed below:       therapeutic exercises to develop strength, endurance, ROM, and flexibility for 40 minutes including:     Objective measures taken  Updated HEP provided     Supine SLR 2 x 10  Seated heel slides Right x30 - NP today   Seated LAQ 3 sec hold 3 x 10   Sidelying clams c/ 3 sec hold red thera band 3 x 10 both sides  Sidelying hip abduction 3 x 10 right lower extremity   Prone hip extension 3 x 10 right lower extremity   Prong hamstring curl 2 x 10 - NP today  Standing TKE c/ RTB 5 sec hold 2x10   Mini squats c/ UE support 2x10  Sit to stands c/ UE support 2x10     manual therapy techniques: were applied to the: right knee for 00 minutes, including:     Distraction + passive flexion seated at edge of table  Trigger point release     Ice to right knee x 5 min at end of session    Patient Education and Home Exercises       Education ovided:   - home exercise program  - knee brace to purchase     Written Home Exercises Provided: yes. Exercises were reviewed and Jennifer was able to demonstrate them prior to the end of the session.  Jennifer demonstrated good  understanding of the education provided. See Electronic Medical Record under Patient Instructions for exercises provided during therapy sessions    Assessment   Patient continues to be limited by Right knee pain with ADLs, prolonged walking, and performing household chores. She reports improvement in pain overall since starting therapy, but continues to have increased pain with prolonged activity and walking. She still demonstrates deficits in Right LE strength and demo moderate TTP to Right medial tibiofemoral joint line. She has been limited with progressions in treatment due to pain and demonstrates minimal overall improvement since PT initial evaluation. She also missed PT visits for 2 weeks due to being sick. She has not been able to return to  work due to R knee pain. She has follow up visit with MD next week for re-assessment. Will hold physical therapy at this time until MD follow up appointment.       Pt prognosis is Good.  Pt's spiritual, cultural and educational needs considered and pt agreeable to plan of care and goals.     Anticipated barriers to physical therapy: none     Goals:   SHORT TERM GOALS:  4 weeks 9/18/23   Patient will be independent with HEP in order to assist therapy in return to maximal function. MET    Pain rating at Worst: 4/10 or less for improved tolerance to prolonged walking.  Not met   Patient will demonstrate Right knee AROM flexion at least 120 degrees.   MET      LONG TERM GOALS: 8 weeks 9/18/23   Patient will improve AROM to stated goals in order to return to maximal functional potential.   NOT MET   Patient will demonstrate gross Right LE strength 5/5 to climb stairs with minimal to no difficulty.  NOT MET     Pain Rating at Worst: 2/10 or less to improve overall Quality of Life.        NOT MET     Patient will meet predicted functional outcome (FOTO) score: 60% functional ability or greater to increase self-perceived functional ability.       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        NOT MET         Plan   Hold physical therapy until patient follows up with MD.      Milagro Cleary, PT

## 2023-11-10 NOTE — PROGRESS NOTES
Patient came into Physical Therapy treatment today, however, upon entering treatment patient informed Physical Therapist Assistant she received an injection yesterday in her knee.     After reading note from yesterday, Physical Therapist and Physical Therapist Assistant discussed concerns on friction and heating the joint dispersing the injection and patient not getting best benefit from injection. Patient was okay with holding off on Physical Therapy treatment until Monday. Physical Therapist Assistant discussed with patient to relax and ice for the next day to give injection time to work.    indwelling

## 2023-12-06 ENCOUNTER — OFFICE VISIT (OUTPATIENT)
Dept: ORTHOPEDICS | Facility: CLINIC | Age: 43
End: 2023-12-06
Payer: COMMERCIAL

## 2023-12-06 DIAGNOSIS — M17.11 PRIMARY OSTEOARTHRITIS OF RIGHT KNEE: Primary | ICD-10-CM

## 2023-12-06 PROCEDURE — 1160F PR REVIEW ALL MEDS BY PRESCRIBER/CLIN PHARMACIST DOCUMENTED: ICD-10-PCS | Mod: CPTII,S$GLB,, | Performed by: ORTHOPAEDIC SURGERY

## 2023-12-06 PROCEDURE — 99999 PR PBB SHADOW E&M-EST. PATIENT-LVL II: ICD-10-PCS | Mod: PBBFAC,,, | Performed by: ORTHOPAEDIC SURGERY

## 2023-12-06 PROCEDURE — 1159F PR MEDICATION LIST DOCUMENTED IN MEDICAL RECORD: ICD-10-PCS | Mod: CPTII,S$GLB,, | Performed by: ORTHOPAEDIC SURGERY

## 2023-12-06 PROCEDURE — 1159F MED LIST DOCD IN RCRD: CPT | Mod: CPTII,S$GLB,, | Performed by: ORTHOPAEDIC SURGERY

## 2023-12-06 PROCEDURE — 1160F RVW MEDS BY RX/DR IN RCRD: CPT | Mod: CPTII,S$GLB,, | Performed by: ORTHOPAEDIC SURGERY

## 2023-12-06 PROCEDURE — 99212 OFFICE O/P EST SF 10 MIN: CPT | Mod: S$GLB,,, | Performed by: ORTHOPAEDIC SURGERY

## 2023-12-06 PROCEDURE — 99212 PR OFFICE/OUTPT VISIT, EST, LEVL II, 10-19 MIN: ICD-10-PCS | Mod: S$GLB,,, | Performed by: ORTHOPAEDIC SURGERY

## 2023-12-06 PROCEDURE — 99999 PR PBB SHADOW E&M-EST. PATIENT-LVL II: CPT | Mod: PBBFAC,,, | Performed by: ORTHOPAEDIC SURGERY

## 2023-12-06 NOTE — PROGRESS NOTES
Assessment: 43 y.o. female with Right knee osteoarthritis, degenerative meniscus tears     We discussed the findings on xray and clinical exam as well as the natural history of arthritis.     Plan:   - Follow up with occupation health   - Return if additional injections indicated  - RTC PRN     All questions were answered in detail. The patient is in full agreement with the treatment plan and will proceed accordingly.      Chief Complaint   Patient presents with    Right Knee - Pain      HPI (9/21/23): Jennifer Foss is a 43 y.o. female who presents today complaining of right knee pain  Duration of symptoms:  about 6 weeks  Trauma or new activity: yes - pivoted while carrying something - felt pain in her R knee and back  Felt a little pain the next day in the knee. No pain in back the next day   Has had pain since then  No improvement with diclofenac pills and cream x 2 weeks prescribed by PCP   Did PT this week - concern for meniscus tear   Has had some swelling   + popping   Has not been able to go to work because of her pain   Has MRI ordered     Has another chart: [id number]  PT visits are under this MRN     10/11/23  Here for MRI results  Still having severe knee pain   Does not feel she can return to work   Today she states the injury occurred at work and has WC paperwork with her   Has not been seen by [facility] health     12/6/23  Has not been to see occupational health  She says someone called her in November but never called her back - it is unclear what she is talking about and she does not know specifics  Kendrick Lynn documented calling her 10/11/23 and leaving a voicemail   Some days she feels better   She states she needs workers comp papers filled out but she does not have them with her - [employer] has not given her anything else  Working with [facility] per patient- not in chart   Finished PT in November. She did find it to be helpful   Has been doing HEP    This is the extent of the patient's  complaints at this time.     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    Patient Active Problem List   Diagnosis    Decreased range of motion (ROM) of right knee    Weakness of right lower extremity    Impaired gait and mobility       No past surgical history on file.      No family history on file.    Physical Exam:   Vitals:    12/06/23 1354   PainSc:   3   PainLoc: Knee       General: Patient is alert, awake and oriented to time, place and person. Mood and affect are appropriate.  Patient does not appear to be in any distress, denies any constitutional symptoms and appears stated age.   HEENT: Pupils are equal and round, sclera are not injected. External examination of ears and nose reveals no abnormalities. Cranial nerves II-X are grossly intact  Skin: no rashes, abrasions or open wounds on the affected extremity   Resp: No respiratory distress or audible wheezing   CV: 2+  pulses, all extremities warm and well perfused   Right knee(s)  Bilateral KNEE EXAMINATION      OBSERVATION / INSPECTION   Gait:                            antaglic  Alignment:                  neutral  Scars:                         none  Muscle atrophy:          none  Effusion:                     none  Warmth:                      none   Discoloration:              none      TENDERNESS                                                                                                                               Patella                         -                       Lateral joint line                       -   Peripatellar medial      -                       Medial joint line                       ++     Peripatellar lateral       -                       Medial plica                              -             Patellar tendon            -                       Popliteal fossa                        -             Quad tendon               -                       Gastrocnemius                        -  Prepatellar Bursa        -                       Quadricep                               -  Tibial tubercle              -                      Thigh/hamstring                      -  Pes anserine/HS         -                       Fibula                                      -  ITB                               -                      Tibia                                        -  Tib/fib joint                   -                      LCL                                          -                         MFC                            -                       MCL:    Proximal                      -                         LFC                             -                                   Distal                           -                                                                ROM:                          PASSIVE                                                 ACTIVE   Left :                            0 / 150                                  0 / 150      Right :                          0 / 140                             0 / 140      PATELLOFEMORAL EXAMINATION:  See above noted areas of tenderness.   Patella position    Subluxation / dislocation:         Normal   Sup / Inf;                                  Normal   Crepitus (PF):                                      Absent   Patellar Mobility:                                               Medial-lateral:                         Normal  Superior-inferior:                     Normal   Inferior tilt                                Normal  Patellar tendon:                       Normal  Lateral tilt:                               Normal  J-sign:                                    Absent  Patellofemoral grind:               non painful         MENISCAL  SIGNS:                              Pain on terminal extension:                yes  Pain on terminal flexion:                     yes  Hemanths maneuver:                       + medial   Squat                                                  not tested      LIGAMENT EXAMINATION:  ACL / Lachman:         normal (0 - 2 mm)   PCL-Post.  drawer:   normal (0 - 2 mm)  MCL- Valgus:             negative  LCL- Varus:  negative       STRENGTH:   (* = with pain)           PAINFUL SIDE  Quadricep                               5/5  Hamstrin/5     EXTREMITY NEURO-VASCULAR EXAMINATION:     Sensation:  Grossly intact to light touch all dermatomal regions.   Motor Function:  Fully intact motor function at hip, knee, foot and ankle    DTRs;  quadriceps and  achilles 2+.    Vascular status:  DP and PT pulses 2+, brisk capillary refill, symmetric.       Imaging:  3 views right knee: mild medial compartment OA with joint space narrowing  MRI of the right knee shows full thickness loss of medial cartilage, associated degenerative meniscus tears. No subchondral edema    I personally reviewed and interpreted the patient's imaging obtained prior to visit      This note was created by combination of typed  and M-Modal dictation. Transcription and phonetic errors may be present.  If there are any questions, please contact me.      Current Outpatient Medications:     diclofenac (VOLTAREN) 75 MG EC tablet, Take 1 tablet (75 mg total) by mouth 2 (two) times daily., Disp: 60 tablet, Rfl: 0

## 2023-12-08 ENCOUNTER — TELEPHONE (OUTPATIENT)
Dept: ORTHOPEDICS | Facility: CLINIC | Age: 43
End: 2023-12-08
Payer: COMMERCIAL

## 2023-12-08 NOTE — TELEPHONE ENCOUNTER
----- Message from Chapito Lynn LPN sent at 12/7/2023  1:25 PM CST -----  I have never received anything regarding this patient that it's workcomp.  I've left messages for her as did pre-service.  All her visits/treatments are billing to [facility].    Kendrick     ----- Message -----  From: Marlene Qiu MA  Sent: 12/6/2023   2:38 PM CST  To: Chapito Lynn LPN    Patient came into the clinic today to see  , but she wants for her to follow up with [facility]. Thanks  Marlene

## 2023-12-20 ENCOUNTER — TELEPHONE (OUTPATIENT)
Dept: ORTHOPEDICS | Facility: CLINIC | Age: 43
End: 2023-12-20
Payer: COMMERCIAL

## 2023-12-20 NOTE — TELEPHONE ENCOUNTER
Spoken with patient to let her know that she need to drop off her paperwork  for her worker's comp to the . Patient understand what was told to her.